# Patient Record
(demographics unavailable — no encounter records)

---

## 2024-12-20 NOTE — HISTORY OF PRESENT ILLNESS
[FreeTextEntry1] : 41yo IUD in place (string off) no complaints  chronic oligomenorrhea  ` ~ 1 year notes hot flashes

## 2024-12-20 NOTE — DISCUSSION/SUMMARY
[FreeTextEntry1] : well woman  IUD for 12 years (2030  no menses x 1 yeat (diego) h/o olidomenorrhea provera challenge FSH menopause vs worsening olido (note no atrophy or other comolaiants)  rec gastro as [facility] woman  pt to call after  meds